# Patient Record
Sex: MALE | Race: OTHER | Employment: FULL TIME | URBAN - METROPOLITAN AREA
[De-identification: names, ages, dates, MRNs, and addresses within clinical notes are randomized per-mention and may not be internally consistent; named-entity substitution may affect disease eponyms.]

---

## 2017-04-19 ENCOUNTER — OFFICE VISIT (OUTPATIENT)
Dept: NEUROLOGY | Age: 37
End: 2017-04-19

## 2017-04-19 VITALS
HEIGHT: 72 IN | WEIGHT: 200.2 LBS | SYSTOLIC BLOOD PRESSURE: 118 MMHG | BODY MASS INDEX: 27.12 KG/M2 | DIASTOLIC BLOOD PRESSURE: 80 MMHG | OXYGEN SATURATION: 98 % | HEART RATE: 86 BPM

## 2017-04-19 DIAGNOSIS — E55.9 VITAMIN D DEFICIENCY: ICD-10-CM

## 2017-04-19 DIAGNOSIS — R20.2 PARESTHESIA: Primary | ICD-10-CM

## 2017-04-19 RX ORDER — ERGOCALCIFEROL 1.25 MG/1
50000 CAPSULE ORAL
Qty: 12 CAP | Refills: 0 | Status: SHIPPED | OUTPATIENT
Start: 2017-04-19 | End: 2017-07-06

## 2017-04-19 NOTE — PROGRESS NOTES
NEUROLOGY NEW PATIENT CONSULATION  REFERRED BY:  No primary care provider on file. 04/19/17    Chief Complaint   Patient presents with    New Patient     Muscle weakness in legs/arms/burning all over       [address] [facility]  Tian Dudley is a 42 y.o. male who presented to the neurology office for management of burning sensation. The patient states that the symptoms started around a month ago and it started off with a burning sensation of the face. That resolved and then the patient had burning and tingling sensation of his fingers and hands and later on he developed it in his feet bilaterally. His symptoms lasts for a few seconds to a few minutes. .  There are no aggravating or relieving factors and he does not complain of any motor symptoms. He did go to the [facility] and got blood work and that was normal.    Current Outpatient Prescriptions   Medication Sig    ergocalciferol (ERGOCALCIFEROL) 50,000 unit capsule Take 1 Cap by mouth every seven (7) days for 12 doses. No current facility-administered medications for this visit. Past medical history  No hypertension or diabetes    Family history  No history of migraines or neuromuscular problems    Social History   Substance Use Topics    Smoking status: Never Smoker    Smokeless tobacco: None    Alcohol use No       REVIEW OF SYSTEMS:   A ten system review of constitutional, cardiovascular, respiratory, musculoskeletal, endocrine, skin, SHEENT, genitourinary, psychiatric and neurologic systems was obtained and is unremarkable with the exception of numbness and tingling     EXAMINATION:   Visit Vitals    /80    Pulse 86    Ht 6' (1.829 m)    Wt 200 lb 3.2 oz (90.8 kg)    SpO2 98%    BMI 27.15 kg/m2        General:   General appearance: Pt is in no acute distress   Distal pulses are preserved  Fundoscopic Exam: Attempted    Neurological Examination:   Mental Status: AAO x3. Speech is fluent.  Follows commands, has normal fund of knowledge, attention, short term recall, comprehension and insight. Cranial Nerves: Visual fields are full. PERRL, Extraocular movements are full. Facial sensation intact V1- V3. Facial movement intact, symmetric. Hearing intact to conversation. Palate elevates symmetrically. Shoulder shrug symmetric. Tongue midline. Motor: Strength is 5/5 in all 4 ext. No atrophy. Tone: Normal    Sensation: Normal to light touch    Reflexes: DTRs 2+ throughout. Plantar responses downgoing. Coordination/Cerebellar: Intact to finger-nose-finger     Gait: Romberg is negative and casual gait is normal.     Skin: No significant bruising or lacerations. Laboratory review:   No results found for this or any previous visit. 4/13/2017  Vitamin D 18.6, CBC normal, thyroid function test normal, vitamin B-12 1205, CMP normal    Imaging review:  None    Documentation review:  I did review the primary care physician's note from April 13, 2017. The patient presented with concern of tingling of feet and toes and sometimes arm weakness. The patient is wondering if he has diabetes. The patient is also planning to see neurology. Patient started taking vitamin B12 orally. Examination is normal.  Vitamin B12, thyroid function test, vitamin D and CBC was ordered. Assessment/Plan:   Jennifer Melissa is a 42 y.o. male who presented to the neurology office for management of intermittent burning and stinging sensation sometimes in the face and sometimes in the upper or lower extremities. When it happens it is in a small area. His initial blood work has been normal.  Examination is normal as well. I plan to get MRI of the brain to look into the possibility of multiple sclerosis. Also, I will be performing an EMG/nerve conduction study to look for any nerve damage. He does have vitamin D deficiency and I am going to prescribe vitamin D 50,000 units to be taken every week for the next 12 weeks.     Follow-up in 3 months      ICD-10-CM ICD-9-CM    1. Paresthesia R20.2 782.0 MRI BRAIN WO CONT      EMG LIMITED   2. Vitamin D deficiency E55.9 268.9 ergocalciferol (ERGOCALCIFEROL) 50,000 unit capsule      Thank you for allowing me to participate in the care of Mr. Paul Key. Please feel free to contact me if you have any questions. Estpehania Walker MD  Diplomate, American Board of Psychiatry & Neurology (Neurology)  [place] Board of Psychiatry & Neurology (Clinical Neurophysiology)    CC: No primary care provider on file. Fax: None    This note was created using voice recognition software. Despite editing, there may be syntax errors. This note will not be viewable in [address]

## 2017-04-19 NOTE — LETTER
4/19/2017 4:05 PM 
 
Patient:    Sergio Alejandra YOB: 1980 Date of Visit:    4/19/2017 Dear No primary care provider on file. Thank you for referring Mr. Sergio Alejandra to me for evaluation/treatment. Below are the relevant portions of my assessment and plan of care. NEUROLOGY NEW PATIENT CONSULATION 
REFERRED BY: 
No primary care provider on file. 04/19/17 Chief Complaint Patient presents with  New Patient Muscle weakness in legs/arms/burning all over HISTORY OF PRESENT ILLNESS Sergio Alejandra is a 42 y.o. male who presented to the neurology office for management of burning sensation. The patient states that the symptoms started around a month ago and it started off with a burning sensation of the face. That resolved and then the patient had burning and tingling sensation of his fingers and hands and later on he developed it in his feet bilaterally. His symptoms lasts for a few seconds to a few minutes. .  There are no aggravating or relieving factors and he does not complain of any motor symptoms. He did go to the [facility] and got blood work and that was normal. 
 
Current Outpatient Prescriptions Medication Sig  ergocalciferol (ERGOCALCIFEROL) 50,000 unit capsule Take 1 Cap by mouth every seven (7) days for 12 doses. No current facility-administered medications for this visit. Past medical history No hypertension or diabetes Family history No history of migraines or neuromuscular problems Social History Substance Use Topics  Smoking status: Never Smoker  Smokeless tobacco: None  Alcohol use No  
 
 
REVIEW OF SYSTEMS:  
A ten system review of constitutional, cardiovascular, respiratory, musculoskeletal, endocrine, skin, SHEENT, genitourinary, psychiatric and neurologic systems was obtained and is unremarkable with the exception of numbness and tingling EXAMINATION:  
Visit Vitals  /80  Pulse 86  
  Ht 6' (1.829 m)  Wt 200 lb 3.2 oz (90.8 kg)  SpO2 98%  BMI 27.15 kg/m2 General:  
General appearance: Pt is in no acute distress Distal pulses are preserved Fundoscopic Exam: Attempted Neurological Examination:  
Mental Status: AAO x3. Speech is fluent. Follows commands, has normal fund of knowledge, attention, short term recall, comprehension and insight. Cranial Nerves: Visual fields are full. PERRL, Extraocular movements are full. Facial sensation intact V1- V3. Facial movement intact, symmetric. Hearing intact to conversation. Palate elevates symmetrically. Shoulder shrug symmetric. Tongue midline. Motor: Strength is 5/5 in all 4 ext. No atrophy. Tone: Normal 
 
Sensation: Normal to light touch Reflexes: DTRs 2+ throughout. Plantar responses downgoing. Coordination/Cerebellar: Intact to finger-nose-finger Gait: Romberg is negative and casual gait is normal.  
 
Skin: No significant bruising or lacerations. Laboratory review: No results found for this or any previous visit. 4/13/2017 Vitamin D 18.6, CBC normal, thyroid function test normal, vitamin B-12 1205, CMP normal 
 
Imaging review: 
None Documentation review: I did review the primary care physician's note from April 13, 2017. The patient presented with concern of tingling of feet and toes and sometimes arm weakness. The patient is wondering if he has diabetes. The patient is also planning to see neurology. Patient started taking vitamin B12 orally. Examination is normal.  Vitamin B12, thyroid function test, vitamin D and CBC was ordered. Assessment/Plan:  
Radha Devries is a 42 y.o. male who presented to the neurology office for management of intermittent burning and stinging sensation sometimes in the face and sometimes in the upper or lower extremities. When it happens it is in a small area.   His initial blood work has been normal.  Examination is normal as well. I plan to get MRI of the brain to look into the possibility of multiple sclerosis. Also, I will be performing an EMG/nerve conduction study to look for any nerve damage. He does have vitamin D deficiency and I am going to prescribe vitamin D 50,000 units to be taken every week for the next 12 weeks. Follow-up in 3 months ICD-10-CM ICD-9-CM 1. Paresthesia R20.2 782.0 MRI BRAIN WO CONT  
   EMG LIMITED 2. Vitamin D deficiency E55.9 268.9 ergocalciferol (ERGOCALCIFEROL) 50,000 unit capsule Thank you for allowing me to participate in the care of Mr. Tosha Neff. Please feel free to contact me if you have any questions. Ranjana Rea MD 
Diplomate, American Board of Psychiatry & Neurology (Neurology) [facility] Board of Psychiatry & Neurology (Clinical Neurophysiology) CC: No primary care provider on file. Fax: None This note was created using voice recognition software. Despite editing, there may be syntax errors. This note will not be viewable in [address] If you have questions, please do not hesitate to call me. I look forward to following Mr. Mcdonald along with you. Sincerely, Ranjana Rea MD

## 2017-04-19 NOTE — PATIENT INSTRUCTIONS
1.  EMG/nerve conduction study  2. MRI of the brain without contrast  3. Vitamin D 50,000 units every week for 12 weeks  4. Follow-up in 3 months      A Healthy Lifestyle: Care Instructions  Your Care Instructions  A healthy lifestyle can help you feel good, stay at a healthy weight, and have plenty of energy for both work and play. A healthy lifestyle is something you can share with your whole family. A healthy lifestyle also can lower your risk for serious health problems, such as high blood pressure, heart disease, and diabetes. You can follow a few steps listed below to improve your health and the health of your family. Follow-up care is a key part of your treatment and safety. Be sure to make and go to all appointments, and call your doctor if you are having problems. Its also a good idea to know your test results and keep a list of the medicines you take. How can you care for yourself at home? · Do not eat too much sugar, fat, or fast foods. You can still have dessert and treats now and then. The goal is moderation. · Start small to improve your eating habits. Pay attention to portion sizes, drink less juice and soda pop, and eat more fruits and vegetables. ¨ Eat a healthy amount of food. A 3-ounce serving of meat, for example, is about the size of a deck of cards. Fill the rest of your plate with vegetables and whole grains. ¨ Limit the amount of soda and sports drinks you have every day. Drink more water when you are thirsty. ¨ Eat at least 5 servings of fruits and vegetables every day. It may seem like a lot, but it is not hard to reach this goal. A serving or helping is 1 piece of fruit, 1 cup of vegetables, or 2 cups of leafy, raw vegetables. Have an apple or some carrot sticks as an afternoon snack instead of a candy bar. Try to have fruits and/or vegetables at every meal.  · Make exercise part of your daily routine.  You may want to start with simple activities, such as walking, bicycling, or slow swimming. Try to be active 30 to 60 minutes every day. You do not need to do all 30 to 60 minutes all at once. For example, you can exercise 3 times a day for 10 or 20 minutes. Moderate exercise is safe for most people, but it is always a good idea to talk to your doctor before starting an exercise program.  · Keep moving. [place] the lawn, work in the garden, or [place]. Take the stairs instead of the elevator at work. · If you smoke, quit. People who smoke have an increased risk for heart attack, stroke, cancer, and other lung illnesses. Quitting is hard, but there are ways to boost your chance of quitting tobacco for good. ¨ Use nicotine gum, patches, or lozenges. ¨ Ask your doctor about stop-smoking programs and medicines. ¨ Keep trying. In addition to reducing your risk of diseases in the future, you will notice some benefits soon after you stop using tobacco. If you have shortness of breath or asthma symptoms, they will likely get better within a few weeks after you quit. · Limit how much alcohol you drink. Moderate amounts of alcohol (up to 2 drinks a day for men, 1 drink a day for women) are okay. But drinking too much can lead to liver problems, high blood pressure, and other health problems. Family health  If you have a family, there are many things you can do together to improve your health. · Eat meals together as a family as often as possible. · Eat healthy foods. This includes fruits, vegetables, lean meats and dairy, and whole grains. · Include your family in your fitness plan. Most people think of activities such as jogging or tennis as the way to fitness, but there are many ways you and your family can be more active. Anything that makes you breathe hard and gets your heart pumping is exercise. Here are some tips:  ¨ Walk to do errands or to take your child to school or the bus. ¨ Go for a family bike ride after dinner instead of watching TV. Where can you learn more?   Go to http://jayda-channing.info/. Enter O808 in the search box to learn more about \"A Healthy Lifestyle: Care Instructions. \"  Current as of: July 26, 2016  Content Version: 11.2  © 2713-5556 [employer]. Care instructions adapted under license by [employer] (which disclaims liability or warranty for this information). If you have questions about a medical condition or this instruction, always ask your healthcare professional. [address] any warranty or liability for your use of this information.

## 2017-04-19 NOTE — MR AVS SNAPSHOT
Visit Information Date & Time Provider Department Dept. Phone Encounter #  
 4/19/2017  3:00 PM Rahat Pereira MD [facility]rology [facility] at [facility] [phone] [id number] Upcoming Health Maintenance Date Due DTaP/Tdap/Td series (1 - Tdap) 3/16/1999 INFLUENZA AGE 9 TO ADULT 8/1/2016 Allergies as of 4/19/2017  Review Complete On: 4/19/2017 By: Rahat Pereira MD  
 Not on File Current Immunizations  Never Reviewed No immunizations on file. Not reviewed this visit You Were Diagnosed With   
  
 Codes Comments Paresthesia    -  Primary ICD-10-CM: R20.2 ICD-9-CM: 968. 0 Vitamin D deficiency     ICD-10-CM: E55.9 ICD-9-CM: 268.9 Vitals BP Pulse Height(growth percentile) Weight(growth percentile) SpO2 BMI  
 118/80 86 6' (1.829 m) 200 lb 3.2 oz (90.8 kg) 98% 27.15 kg/m2 Smoking Status Never Smoker Vitals History BMI and BSA Data Body Mass Index Body Surface Area  
 27.15 kg/m 2 2.15 m 2 Preferred Pharmacy Pharmacy Name Phone [id number], [address] [phone] Your Updated Medication List  
  
   
This list is accurate as of: 4/19/17  3:55 PM.  Always use your most recent med list.  
  
  
  
  
 ergocalciferol 50,000 unit capsule Commonly known as:  ERGOCALCIFEROL Take 1 Cap by mouth every seven (7) days for 12 doses. Prescriptions Sent to Pharmacy Refills  
 ergocalciferol (ERGOCALCIFEROL) 50,000 unit capsule 0 Sig: Take 1 Cap by mouth every seven (7) days for 12 doses. Class: Normal  
 Pharmacy: [id number]- [address] Ph #: [phone] Route: Oral  
  
To-Do List   
 04/19/2017 Neurology:  EMG LIMITED   
  
 04/19/2017 Imaging:  MRI BRAIN WO CONT Patient Instructions 1.  EMG/nerve conduction study 2. MRI of the brain without contrast 
3. Vitamin D 50,000 units every week for 12 weeks 4. Follow-up in 3 months A Healthy Lifestyle: Care Instructions Your Care Instructions A healthy lifestyle can help you feel good, stay at a healthy weight, and have plenty of energy for both work and play. A healthy lifestyle is something you can share with your whole family. A healthy lifestyle also can lower your risk for serious health problems, such as high blood pressure, heart disease, and diabetes. You can follow a few steps listed below to improve your health and the health of your family. Follow-up care is a key part of your treatment and safety. Be sure to make and go to all appointments, and call your doctor if you are having problems. Its also a good idea to know your test results and keep a list of the medicines you take. How can you care for yourself at home? · Do not eat too much sugar, fat, or fast foods. You can still have dessert and treats now and then. The goal is moderation. · Start small to improve your eating habits. Pay attention to portion sizes, drink less juice and soda pop, and eat more fruits and vegetables. ¨ Eat a healthy amount of food. A 3-ounce serving of meat, for example, is about the size of a deck of cards. Fill the rest of your plate with vegetables and whole grains. ¨ Limit the amount of soda and sports drinks you have every day. Drink more water when you are thirsty. ¨ Eat at least 5 servings of fruits and vegetables every day. It may seem like a lot, but it is not hard to reach this goal. A serving or helping is 1 piece of fruit, 1 cup of vegetables, or 2 cups of leafy, raw vegetables. Have an apple or some carrot sticks as an afternoon snack instead of a candy bar. Try to have fruits and/or vegetables at every meal. 
· Make exercise part of your daily routine.  You may want to start with simple activities, such as walking, bicycling, or slow swimming. Try to be active 30 to 60 minutes every day. You do not need to do all 30 to 60 minutes all at once. For example, you can exercise 3 times a day for 10 or 20 minutes. Moderate exercise is safe for most people, but it is always a good idea to talk to your doctor before starting an exercise program. 
· Keep moving. Megha [place] the lawn, work in the garden, or [place]. Take the stairs instead of the elevator at work. · If you smoke, quit. People who smoke have an increased risk for heart attack, stroke, cancer, and other lung illnesses. Quitting is hard, but there are ways to boost your chance of quitting tobacco for good. ¨ Use nicotine gum, patches, or lozenges. ¨ Ask your doctor about stop-smoking programs and medicines. ¨ Keep trying. In addition to reducing your risk of diseases in the future, you will notice some benefits soon after you stop using tobacco. If you have shortness of breath or asthma symptoms, they will likely get better within a few weeks after you quit. · Limit how much alcohol you drink. Moderate amounts of alcohol (up to 2 drinks a day for men, 1 drink a day for women) are okay. But drinking too much can lead to liver problems, high blood pressure, and other health problems. Family health If you have a family, there are many things you can do together to improve your health. · Eat meals together as a family as often as possible. · Eat healthy foods. This includes fruits, vegetables, lean meats and dairy, and whole grains. · Include your family in your fitness plan. Most people think of activities such as jogging or tennis as the way to fitness, but there are many ways you and your family can be more active. Anything that makes you breathe hard and gets your heart pumping is exercise. Here are some tips: 
¨ Walk to do errands or to take your child to school or the bus. ¨ Go for a family bike ride after dinner instead of watching TV. Where can you learn more? Go to http://jayda-channing.info/. Enter S373 in the search box to learn more about \"A Healthy Lifestyle: Care Instructions. \" Current as of: July 26, 2016 Content Version: 11.2 © 5209-5539 [employer]. Care instructions adapted under license by [employer] (which disclaims liability or warranty for this information). If you have questions about a medical condition or this instruction, always ask your healthcare professional. [address] any warranty or liability for your use of this information. Introducing [facility]! Damon Jay introduces Get In patient portal. Now you can access parts of your medical record, email your doctor's office, and request medication refills online. 1. In your internet browser, go to [email or web address] 2. Click on the First Time User? Click Here link in the Sign In box. You will see the New Member Sign Up page. 3. Enter your Get In Access Code exactly as it appears below. You will not need to use this code after youve completed the sign-up process. If you do not sign up before the expiration date, you must request a new code. · Get In Access Code: [email or web address] Expires: 7/18/2017  3:01 PM 
 
4. Enter the last four digits of your Social Security Number (xxxx) and Date of Birth (mm/dd/yyyy) as indicated and click Submit. You will be taken to the next sign-up page. 5. Create a Clear Metalst ID. This will be your Get In login ID and cannot be changed, so think of one that is secure and easy to remember. 6. Create a [employer] password. You can change your password at any time. 7. Enter your Password Reset Question and Answer. This can be used at a later time if you forget your password. 8. Enter your e-mail address.  You will receive e-mail notification when new information is available in [email or web address]. 9. Click Sign Up. You can now view and download portions of your medical record. 10. Click the Download Summary menu link to download a portable copy of your medical information. If you have questions, please visit the Frequently Asked Questions section of the [email or web address] website. Remember, [email or web address] is NOT to be used for urgent needs. For medical emergencies, dial 911. Now available from your iPhone and Android! Please provide this summary of care documentation to your next provider. If you have any questions after today's visit, please call [phone].

## 2017-05-25 ENCOUNTER — OFFICE VISIT (OUTPATIENT)
Dept: NEUROLOGY | Age: 37
End: 2017-05-25

## 2017-05-25 VITALS — SYSTOLIC BLOOD PRESSURE: 131 MMHG | DIASTOLIC BLOOD PRESSURE: 91 MMHG | HEART RATE: 97 BPM | OXYGEN SATURATION: 98 %

## 2017-05-25 DIAGNOSIS — R20.2 PARESTHESIA: Primary | ICD-10-CM

## 2017-05-25 DIAGNOSIS — E55.9 VITAMIN D DEFICIENCY: ICD-10-CM

## 2017-05-25 NOTE — PROGRESS NOTES
Neurology follow-up note    REFERRED BY:  No primary care provider on file. 05/25/17    Chief complaint: Burning sensation    Subjective  Benjamin Curiel is a 42 y.o. male who presented to the neurology office for management of burning sensation. The patient states that the symptoms started around a month ago and it started off with a burning sensation of the face. That resolved and then the patient had burning and tingling sensation of his fingers and hands and later on he developed it in his feet bilaterally. His symptoms lasts for a few seconds to a few minutes. .  There are no aggravating or relieving factors and he does not complain of any motor symptoms. He did go to the [facility] and got blood work and that was normal.    Since the last visit, I did perform an EMG/nerve conduction study which was normal and did not show any length dependent neuropathy. Current Outpatient Prescriptions   Medication Sig    ergocalciferol (ERGOCALCIFEROL) 50,000 unit capsule Take 1 Cap by mouth every seven (7) days for 12 doses. No current facility-administered medications for this visit. REVIEW OF SYSTEMS:   A ten system review of constitutional, cardiovascular, respiratory, musculoskeletal, endocrine, skin, SHEENT, genitourinary, psychiatric and neurologic systems was obtained and is unremarkable with the exception of numbness and tingling     EXAMINATION:   Visit Vitals    BP (!) 131/91 (BP 1 Location: Right arm, BP Patient Position: Sitting)    Pulse 97    SpO2 98%        General:   General appearance: Pt is in no acute distress   Distal pulses are preserved    Neurological Examination:   Mental Status: AAO x3. Speech is fluent. Follows commands, has normal fund of knowledge, attention, short term recall, comprehension and insight. Cranial Nerves: Visual fields are full. PERRL, Extraocular movements are full. Facial sensation intact V1- V3. Facial movement intact, symmetric.  Hearing intact to conversation. Palate elevates symmetrically. Shoulder shrug symmetric. Tongue midline. Motor: Strength is 5/5 in all 4 ext. No atrophy. Tone: Normal    Sensation: Normal to light touch    Coordination/Cerebellar: Intact to finger-nose-finger     Gait: Romberg is negative and casual gait is normal.     Skin: No significant bruising or lacerations. Laboratory review:   No results found for this or any previous visit. 4/13/2017  Vitamin D 18.6, CBC normal, thyroid function test normal, vitamin B-12 1205, CMP normal    Imaging review:  5/25/2017  EMG/nerve conduction study  There is no electrophysiological evidence of a length dependent peripheral neuropathy. Documentation review:  I did review the primary care physician's note from April 13, 2017. The patient presented with concern of tingling of feet and toes and sometimes arm weakness. The patient is wondering if he has diabetes. The patient is also planning to see neurology. Patient started taking vitamin B12 orally. Examination is normal.  Vitamin B12, thyroid function test, vitamin D and CBC was ordered. Assessment/Plan:   Magalis Modi is a 42 y.o. male who presented to the neurology office for management of intermittent burning and stinging sensation sometimes in the face and sometimes in the upper or lower extremities. When it happens it is in a small area. His initial blood work has been normal.  Examination is normal as well. EMG/nerve conduction study was performed today and it is normal.  MRI of the brain is pending. He does have vitamin D deficiency and I am going to prescribe vitamin D 50,000 units to be taken every week for the next 12 weeks. Follow-up in 3 months      ICD-10-CM ICD-9-CM    1. Paresthesia R20.2 782.0    2. Vitamin D deficiency E55.9 268.9       Thank you for allowing me to participate in the care of Mr. Carolyn Fish. Please feel free to contact me if you have any questions.     Maddi Nuñez MD  Diplomate, American Board of Psychiatry & Neurology (Neurology)  Lilliam Alvarez Board of Psychiatry & Neurology (Clinical Neurophysiology)    CC: No primary care provider on file. Fax: None    This note was created using voice recognition software. Despite editing, there may be syntax errors. This note will not be viewable in [address]

## 2017-05-25 NOTE — PROCEDURES
[facility] Neurology Clinic at [address] [facility] [address] [facility], [address]  Tel [phone]     Fax [phone]  Electrodiagnostic Study Report    Patient: Maurice Haro  Age: 42   [facility]#: [id number]  Ref Phys: Brian Mejia   Sex: Male  Physician: Jose Alfredo Jenkins MD   Height: ' \"      : 1980  DOS: 2017       Reason for study:  Maurice Haro 42 y.o. male Presents with burning and tingling.   Query r/o neuropathy    Anti - Sensory Nerve Conduction Studies     Stim Site NR Peak (ms) P-T Amp (µV) Site1 Site2 Delta-P (ms) Dist (cm) Lance (m/s)   Right Median Anti Sensory (2nd Digit)  33.4°C   Wrist    2.9 39.6 Wrist 2nd Digit 2.9 14.0     Right Radial Anti Sensory (Base 1st Digit)  32.9°C   Wrist    2.1 24.9 Wrist Base 1st Digit 2.1 0.0    Right Sup Fibular Anti Sensory (Ant Lat Mall)  31.7°C   14 cm    2.7 11.4 14 cm Ant Lat Mall 2.7 14.0     Right Sural Anti Sensory (Lat Mall)  31.6°C   Calf    3.5 13.5 Calf Lat Mall 3.5 14.0     Right Ulnar Anti Sensory (5th Digit)  33.3°C   Wrist    2.6 37.8 Wrist 5th Digit 2.6 14.0                    Motor Nerve Conduction Studies     Stim Site NR Onset (ms) O-P Amp (mV) Site1 Site2 Delta-0 (ms) Dist (cm) Lance (m/s)   Right Fibular Motor (Ext Dig Brev)  30.9°C   Ankle    4.5 5.6 B Fib Ankle 7.8 36.5 47   B Fib    12.3 4.8 Poplt B Fib 1.9 10.0 53   Poplt    14.2 4.5        Right Median Motor (Abd Poll Brev)  33.5°C   Wrist    2.8 9.7 Elbow Wrist 4.9 29.5 60T   Elbow    7.7 9.4        Right Tibial Motor (Abd Can Brev)  31.4°C   Ankle    3.3 7.8 Knee Ankle 9.9 42.0 42   Knee    13.2 6.9        Right Ulnar Motor (Abd Dig Minimi)  33.1°C   Wrist    2.4 8.0 B Elbow Wrist 4.4 26.0 59   B Elbow    6.8 7.3 A Elbow B Elbow 1.5 10.0 67   A Elbow    8.3 6.9                      Electromyography     Side Muscle Nerve Root Ins Act Fibs Psw Amp Dur Poly Recrt Comment   Right AntTibialis Dp Br Fibular L4-5 Nml Nml Nml Nml Nml 0 Nml    Right Gastroc Tibial S1-2 Nml Nml Nml Nml Nml 0 Nml    Right VastusLat Femoral L2-4 Nml Nml Nml Nml Nml 0 Nml    Right BicepsFemS Sciatic L5-S1 Nml Nml Nml Nml Nml 0 Nml    Right TensorFascLat SupGluteal L4-5, S1 Nml Nml Nml Nml Nml 0 Nml    Right 1stDorInt Ulnar C8-T1 Nml Nml Nml Nml Nml 0 Nml    Right PronatorTeres Median C6-7 Nml Nml Nml Nml Nml 0 Nml    Right Biceps Musculocut C5-6 Nml Nml Nml Nml Nml 0 Nml    Right Triceps Radial C6-7-8 Nml Nml Nml Nml Nml 0 Nml    Right Deltoid Axillary C5-6 Nml Nml Nml Nml Nml 0 Nml        Summary:  Nerve conduction studies of the right upper and lower extremities were unremarkable. Needle electrode examination of the right upper and lower extremities was unremarkable. Impression: This is a normal study. There is no electrophysiological evidence of a length dependent peripheral neuropathy or a right cervical or lumbar radiculopathy.         __________________  Ranjana Rea M.D.

## 2017-07-10 ENCOUNTER — HOSPITAL ENCOUNTER (OUTPATIENT)
Dept: MRI IMAGING | Age: 37
Discharge: HOME | End: 2017-07-10
Attending: PSYCHIATRY & NEUROLOGY
Payer: COMMERCIAL

## 2017-07-10 DIAGNOSIS — R20.2 PARESTHESIA: ICD-10-CM

## 2017-07-10 PROCEDURE — 70551 MRI BRAIN STEM W/O DYE: CPT

## 2017-07-13 ENCOUNTER — OFFICE VISIT (OUTPATIENT)
Dept: NEUROLOGY | Age: 37
End: 2017-07-13

## 2017-07-13 ENCOUNTER — TELEPHONE (OUTPATIENT)
Dept: NEUROLOGY | Age: 37
End: 2017-07-13

## 2017-07-13 VITALS
DIASTOLIC BLOOD PRESSURE: 74 MMHG | OXYGEN SATURATION: 97 % | BODY MASS INDEX: 26.98 KG/M2 | SYSTOLIC BLOOD PRESSURE: 114 MMHG | HEART RATE: 79 BPM | HEIGHT: 72 IN | WEIGHT: 199.2 LBS

## 2017-07-13 DIAGNOSIS — E55.9 VITAMIN D DEFICIENCY: ICD-10-CM

## 2017-07-13 DIAGNOSIS — G60.3 IDIOPATHIC PROGRESSIVE NEUROPATHY: ICD-10-CM

## 2017-07-13 DIAGNOSIS — R20.2 PARESTHESIA: Primary | ICD-10-CM

## 2017-07-13 NOTE — PROCEDURES
NEUROLOGY CLINIC AT [facility]  OFFICE PROCEDURE PROGRESS NOTE        PERFORMING PHYSICIAN: Jeremias Laughlin MD   PROCEDURE:  Skin Punch Biopsy    CPT Code: 37924, 11101 X 2    ICD-10 Code: Polyneuropathy, unspecified  G62.9     Medications/ Supplies:   Skin-punch biopsy kit from [address] [place] [address] including: skin punch biopsy tool, povidone/ iodine prep pad, alcohol prep pad   1% Lidocaine with epinephrine  3 mL syringe, 30G 1/2\" needle    The procedure and potential complications were explained to the patient, and verbal consent was obtained. The skin was cleansed with the betadine swabs over the:    - Right EDB (foot)  - Right Calf (10 centimeters proximal to the lat malleolus)  - Right wrist - 6 cm proximal to the wrist    The perimeter of the cleansed areas was infiltrated with 1% lidocaine with epinephrine. A skin punch biopsy was performed at the distal site with the provided biopsy tool, and the biopsy was placed into container labeled distal sample. The process was repeated at the proximal site and the biopsy was placed into the container labeled proximal sample. The containers were appropriately labeled with the patient names, lids tightened and placed on ice in the supplied styrofoam box and sent by [employer] to the lab. The biopsy sites were cleaned with alcohol swabs x 2 then bandages applied. There were no immediate or obvious complications and the patient did not complain of pain afterwards.          ___________________  Jeremias Laughlin MD

## 2017-07-13 NOTE — TELEPHONE ENCOUNTER
For skin biopsies [id number] and [id number] rick perez'charbel per \"Julio INGRAM\" at 1:57 today [facility].

## 2017-07-13 NOTE — TELEPHONE ENCOUNTER
----- Message from Sarah Monzon sent at 7/13/2017  9:39 AM EDT -----  Regarding: Dr. Fuad Polanco wife Gladys Mcduffie is requesting an appointment for either today or tomorrow for the pt. He will not be able to attend his upcoming appointment due to having to go out of town. Pt best contact number is [phone].

## 2017-07-13 NOTE — PATIENT INSTRUCTIONS
Please call in 2 weeks for the result of the skin biopsy. Call with questions     A Healthy Lifestyle: Care Instructions  Your Care Instructions  A healthy lifestyle can help you feel good, stay at a healthy weight, and have plenty of energy for both work and play. A healthy lifestyle is something you can share with your whole family. A healthy lifestyle also can lower your risk for serious health problems, such as high blood pressure, heart disease, and diabetes. You can follow a few steps listed below to improve your health and the health of your family. Follow-up care is a key part of your treatment and safety. Be sure to make and go to all appointments, and call your doctor if you are having problems. Its also a good idea to know your test results and keep a list of the medicines you take. How can you care for yourself at home? · Do not eat too much sugar, fat, or fast foods. You can still have dessert and treats now and then. The goal is moderation. · Start small to improve your eating habits. Pay attention to portion sizes, drink less juice and soda pop, and eat more fruits and vegetables. ¨ Eat a healthy amount of food. A 3-ounce serving of meat, for example, is about the size of a deck of cards. Fill the rest of your plate with vegetables and whole grains. ¨ Limit the amount of soda and sports drinks you have every day. Drink more water when you are thirsty. ¨ Eat at least 5 servings of fruits and vegetables every day. It may seem like a lot, but it is not hard to reach this goal. A serving or helping is 1 piece of fruit, 1 cup of vegetables, or 2 cups of leafy, raw vegetables. Have an apple or some carrot sticks as an afternoon snack instead of a candy bar. Try to have fruits and/or vegetables at every meal.  · Make exercise part of your daily routine. You may want to start with simple activities, such as walking, bicycling, or slow swimming. Try to be active 30 to 60 minutes every day.  You do not need to do all 30 to 60 minutes all at once. For example, you can exercise 3 times a day for 10 or 20 minutes. Moderate exercise is safe for most people, but it is always a good idea to talk to your doctor before starting an exercise program.  · Keep moving. Dominick Boron the lawn, work in the garden, or [employer]. Take the stairs instead of the elevator at work. · If you smoke, quit. People who smoke have an increased risk for heart attack, stroke, cancer, and other lung illnesses. Quitting is hard, but there are ways to boost your chance of quitting tobacco for good. ¨ Use nicotine gum, patches, or lozenges. ¨ Ask your doctor about stop-smoking programs and medicines. ¨ Keep trying. In addition to reducing your risk of diseases in the future, you will notice some benefits soon after you stop using tobacco. If you have shortness of breath or asthma symptoms, they will likely get better within a few weeks after you quit. · Limit how much alcohol you drink. Moderate amounts of alcohol (up to 2 drinks a day for men, 1 drink a day for women) are okay. But drinking too much can lead to liver problems, high blood pressure, and other health problems. Family health  If you have a family, there are many things you can do together to improve your health. · Eat meals together as a family as often as possible. · Eat healthy foods. This includes fruits, vegetables, lean meats and dairy, and whole grains. · Include your family in your fitness plan. Most people think of activities such as jogging or tennis as the way to fitness, but there are many ways you and your family can be more active. Anything that makes you breathe hard and gets your heart pumping is exercise. Here are some tips:  ¨ Walk to do errands or to take your child to school or the bus. ¨ Go for a family bike ride after dinner instead of watching TV. Where can you learn more? Go to http://jayda-channing.info/.   Enter C908 in the search box to learn more about \"A Healthy Lifestyle: Care Instructions. \"  Current as of: July 26, 2016  Content Version: 11.3  © 7711-3441 [employer]. Care instructions adapted under license by [employer] (which disclaims liability or warranty for this information). If you have questions about a medical condition or this instruction, always ask your healthcare professional. [address] any warranty or liability for your use of this information.

## 2017-07-13 NOTE — MR AVS SNAPSHOT
Visit Information Date & Time Provider Department Dept. Phone Encounter #  
 7/13/2017  1:00 PM Araceli Ba MD Neurology Clinic at [facility] [phone] [id number] Upcoming Health Maintenance Date Due DTaP/Tdap/Td series (1 - Tdap) 3/16/1999 INFLUENZA AGE 9 TO ADULT 8/1/2017 Allergies as of 7/13/2017  Review Complete On: 7/13/2017 By: Araceli Ba MD  
 Not on File Current Immunizations  Never Reviewed No immunizations on file. Not reviewed this visit You Were Diagnosed With   
  
 Codes Comments Paresthesia    -  Primary ICD-10-CM: R20.2 ICD-9-CM: 809. 0 Vitamin D deficiency     ICD-10-CM: E55.9 ICD-9-CM: 268.9 Idiopathic progressive neuropathy     ICD-10-CM: G60.3 ICD-9-CM: 523. 4 Vitals BP Pulse Height(growth percentile) Weight(growth percentile) SpO2 BMI  
 114/74 79 6' (1.829 m) 199 lb 3.2 oz (90.4 kg) 97% 27.02 kg/m2 Smoking Status Never Smoker BMI and BSA Data Body Mass Index Body Surface Area  
 27.02 kg/m 2 2.14 m 2 Preferred Pharmacy Pharmacy Name Phone [id number] [place], [address] [phone] Your Updated Medication List  
  
Notice  As of 7/13/2017  2:40 PM  
 You have not been prescribed any medications. Patient Instructions Please call in 2 weeks for the result of the skin biopsy. Call with questions A Healthy Lifestyle: Care Instructions Your Care Instructions A healthy lifestyle can help you feel good, stay at a healthy weight, and have plenty of energy for both work and play. A healthy lifestyle is something you can share with your whole family. A healthy lifestyle also can lower your risk for serious health problems, such as high blood pressure, heart disease, and diabetes.  
You can follow a few steps listed below to improve your health and the health of your family. Follow-up care is a key part of your treatment and safety. Be sure to make and go to all appointments, and call your doctor if you are having problems. Its also a good idea to know your test results and keep a list of the medicines you take. How can you care for yourself at home? · Do not eat too much sugar, fat, or fast foods. You can still have dessert and treats now and then. The goal is moderation. · Start small to improve your eating habits. Pay attention to portion sizes, drink less juice and soda pop, and eat more fruits and vegetables. ¨ Eat a healthy amount of food. A 3-ounce serving of meat, for example, is about the size of a deck of cards. Fill the rest of your plate with vegetables and whole grains. ¨ Limit the amount of soda and sports drinks you have every day. Drink more water when you are thirsty. ¨ Eat at least 5 servings of fruits and vegetables every day. It may seem like a lot, but it is not hard to reach this goal. A serving or helping is 1 piece of fruit, 1 cup of vegetables, or 2 cups of leafy, raw vegetables. Have an apple or some carrot sticks as an afternoon snack instead of a candy bar. Try to have fruits and/or vegetables at every meal. 
· Make exercise part of your daily routine. You may want to start with simple activities, such as walking, bicycling, or slow swimming. Try to be active 30 to 60 minutes every day. You do not need to do all 30 to 60 minutes all at once. For example, you can exercise 3 times a day for 10 or 20 minutes. Moderate exercise is safe for most people, but it is always a good idea to talk to your doctor before starting an exercise program. 
· Keep moving. Kady Keith the lawn, work in the garden, or [address]. Take the stairs instead of the elevator at work. · If you smoke, quit. People who smoke have an increased risk for heart attack, stroke, cancer, and other lung illnesses.  Quitting is hard, but there are ways to boost your chance of quitting tobacco for good. ¨ Use nicotine gum, patches, or lozenges. ¨ Ask your doctor about stop-smoking programs and medicines. ¨ Keep trying. In addition to reducing your risk of diseases in the future, you will notice some benefits soon after you stop using tobacco. If you have shortness of breath or asthma symptoms, they will likely get better within a few weeks after you quit. · Limit how much alcohol you drink. Moderate amounts of alcohol (up to 2 drinks a day for men, 1 drink a day for women) are okay. But drinking too much can lead to liver problems, high blood pressure, and other health problems. Family health If you have a family, there are many things you can do together to improve your health. · Eat meals together as a family as often as possible. · Eat healthy foods. This includes fruits, vegetables, lean meats and dairy, and whole grains. · Include your family in your fitness plan. Most people think of activities such as jogging or tennis as the way to fitness, but there are many ways you and your family can be more active. Anything that makes you breathe hard and gets your heart pumping is exercise. Here are some tips: 
¨ Walk to do errands or to take your child to school or the bus. ¨ Go for a family bike ride after dinner instead of watching TV. Where can you learn more? Go to http://jayda-channing.info/. Enter U448 in the search box to learn more about \"A Healthy Lifestyle: Care Instructions. \" Current as of: July 26, 2016 Content Version: 11.3 © 1427-4020 [employer]. Care instructions adapted under license by [employer] (which disclaims liability or warranty for this information). If you have questions about a medical condition or this instruction, always ask your healthcare professional. [address] any warranty or liability for your use of this information. Introducing [facility]! Dear Ian Brooks: 
Thank you for requesting a tagga account. Our records indicate that you already have an active tagga account. You can access your account anytime at [email or web address] Did you know that you can access your hospital and ER discharge instructions at any time in tagga? You can also review all of your test results from your hospital stay or ER visit. Additional Information If you have questions, please visit the Frequently Asked Questions section of the tagga website at [email or web address] Remember, tagga is NOT to be used for urgent needs. For medical emergencies, dial 911. Now available from your iPhone and Android! Please provide this summary of care documentation to your next provider. Your primary care clinician is listed as NONE. If you have any questions after today's visit, please call [phone].

## 2017-07-13 NOTE — PROGRESS NOTES
Neurology follow-up note    REFERRED BY:  None    07/13/17    Chief complaint: Burning sensation    Subjective  Pablo Pedersen is a 42 y.o. male who presented to the neurology office for management of burning sensation. The patient states that the symptoms started around a month ago and it started off with a burning sensation of the face. That resolved and then the patient had burning and tingling sensation of his fingers and hands and later on he developed it in his feet bilaterally. His symptoms lasts for a few seconds to a few minutes. .  There are no aggravating or relieving factors and he does not complain of any motor symptoms. He did go to the [facility] and got blood work and that was normal.    EMG/nerve conduction study was normal and did not show any length dependent neuropathy. His MRI of the brain did not show any multiple sclerosis. He is now having small areas of burning in his back and in his feet that lasts for a few seconds. He does not want any gabapentin at this time. No current outpatient prescriptions on file. No current facility-administered medications for this visit. REVIEW OF SYSTEMS:   A ten system review of constitutional, cardiovascular, respiratory, musculoskeletal, endocrine, skin, SHEENT, genitourinary, psychiatric and neurologic systems was obtained and is unremarkable with the exception of numbness and tingling     EXAMINATION:   Visit Vitals    /74    Pulse 79    Ht 6' (1.829 m)    Wt 199 lb 3.2 oz (90.4 kg)    SpO2 97%    BMI 27.02 kg/m2        General:   General appearance: Pt is in no acute distress   Distal pulses are preserved    Neurological Examination:   Mental Status: AAO x3. Speech is fluent. Follows commands, has normal fund of knowledge, attention, short term recall, comprehension and insight. Cranial Nerves: Visual fields are full. PERRL, Extraocular movements are full. Facial sensation intact V1- V3.  Facial movement intact, symmetric. Hearing intact to conversation. Palate elevates symmetrically. Shoulder shrug symmetric. Tongue midline. Motor: Strength is 5/5 in all 4 ext. No atrophy. Tone: Normal    Sensation: Normal to light touch    Coordination/Cerebellar: Intact to finger-nose-finger     Gait: Romberg is negative and casual gait is normal.     Skin: No significant bruising or lacerations. Laboratory review:   No results found for this or any previous visit. 4/13/2017  Vitamin D 18.6, CBC normal, thyroid function test normal, vitamin B-12 1205, CMP normal    Imaging review:  7/10/2017  MRI brain without contrast  No significant abnormality  I reviewed the images    5/25/2017  EMG/nerve conduction study  There is no electrophysiological evidence of a length dependent peripheral neuropathy. Documentation review:  I did review the primary care physician's note from April 13, 2017. The patient presented with concern of tingling of feet and toes and sometimes arm weakness. The patient is wondering if he has diabetes. The patient is also planning to see neurology. Patient started taking vitamin B12 orally. Examination is normal.  Vitamin B12, thyroid function test, vitamin D and CBC was ordered. Assessment/Plan:   Susie Escobar is a 42 y.o. male who presented to the neurology office for management of intermittent burning and stinging sensation sometimes in the face and sometimes in the upper or lower extremities. When it happens it is in a small area. His initial blood work has been normal.  Examination is normal as well. EMG/nerve conduction study was performed today and it is normal.  MRI of the brain is normal.    He does have vitamin D deficiency and he has completed 12 week course of vitamin D supplementation. He states that he is having new areas of burning sensation and some of it is in his feet as well. We will proceed with skin biopsy to check for small fiber neuropathy.     The patient is relocated to [place]. Will call the patient to tell the result of the skin biopsy. Over 25 minutes was spent with the patient and family of which > 50% of the visit was spent counseling on diagnosis, management, and treatment of the diagnosis        ICD-10-CM ICD-9-CM    1. Paresthesia R20.2 782.0    2. Vitamin D deficiency E55.9 268.9       Thank you for allowing me to participate in the care of Mr. Josue Richard. Please feel free to contact me if you have any questions. Jean Carlos Dhillon MD  Diplomate, American Board of Psychiatry & Neurology (Neurology)  [employer] Board of Psychiatry & Neurology (Clinical Neurophysiology)    CC: None  Fax: None    This note was created using voice recognition software. Despite editing, there may be syntax errors. This note will not be viewable in [address]

## 2017-08-16 ENCOUNTER — TELEPHONE (OUTPATIENT)
Dept: NEUROLOGY | Age: 37
End: 2017-08-16

## 2017-08-16 NOTE — TELEPHONE ENCOUNTER
Maryuri Garcia Doctor patient sent you a e-mail regarding his skin biopsy. Patient states he walls schedule an appointment with neurologist in [place] . His question is there something that he needs to be aware of or concern?

## 2017-08-30 ENCOUNTER — TELEPHONE (OUTPATIENT)
Dept: NEUROLOGY | Age: 37
End: 2017-08-30

## 2017-08-30 NOTE — TELEPHONE ENCOUNTER
Left message on patient's voice mail that Dr. Jeremias Gamez does not have any opening and Dr. Jeremias Gamez will not be available the week of September 4th. Please call back to schedule another time.

## 2019-04-15 ENCOUNTER — OFFICE VISIT (OUTPATIENT)
Dept: NEUROLOGY | Age: 39
End: 2019-04-15

## 2019-04-15 VITALS
WEIGHT: 202 LBS | BODY MASS INDEX: 26.77 KG/M2 | OXYGEN SATURATION: 98 % | SYSTOLIC BLOOD PRESSURE: 110 MMHG | HEART RATE: 93 BPM | DIASTOLIC BLOOD PRESSURE: 75 MMHG | HEIGHT: 73 IN

## 2019-04-15 DIAGNOSIS — G60.9 IDIOPATHIC PERIPHERAL NEUROPATHY: ICD-10-CM

## 2019-04-15 DIAGNOSIS — G43.009 MIGRAINE WITHOUT AURA AND WITHOUT STATUS MIGRAINOSUS, NOT INTRACTABLE: Primary | ICD-10-CM

## 2019-04-15 RX ORDER — SUMATRIPTAN 25 MG/1
TABLET, FILM COATED ORAL
Qty: 9 TAB | Refills: 2 | Status: SHIPPED | OUTPATIENT
Start: 2019-04-15

## 2019-04-15 RX ORDER — PROPRANOLOL HYDROCHLORIDE 60 MG/1
60 CAPSULE, EXTENDED RELEASE ORAL DAILY
Qty: 30 CAP | Refills: 2 | Status: SHIPPED | OUTPATIENT
Start: 2019-04-15 | End: 2019-06-15 | Stop reason: SDUPTHER

## 2019-04-15 NOTE — PATIENT INSTRUCTIONS
1.  Imitrex 25 mg to be taken at the onset of headache. Repeat in 2 hours if headaches do persist.  Do not take more than 10 days in a month. 2.  Propranolol 60 mg daily  3. Follow-up in 6 months    Office Policies  · Phone calls/patient messages:  Please allow up to 24 hours for someone in the office to contact you about your call or message. Be mindful your provider may be out of the office or your message may require further review. We encourage you to use [employer] for your messages as this is a faster, more efficient way to communicate with our office  · Medication Refills:  Prescription medications require up to 48 business hours to process. We encourage you to use [employer] for your refills. For controlled medications: Please allow up to 72 business hours to process. Certain medications may require you to  a written prescription at our office. NO narcotic/controlled medications will be prescribed after 4pm Monday through Friday or on weekends  · Form/Paperwork Completion:  Please note there is a $25 fee for all paperwork completed by our providers. We ask that you allow 7-14 business days. Pre-payment is due prior to picking up/faxing the completed form. You may also download your forms to [employer] to have your doctor print off.

## 2019-04-15 NOTE — PROGRESS NOTES
Neurology follow-up note    Chief complaint: Burning sensation    Subjective  Lashell Henson is a 37 y.o. male who presented to the neurology office for management of burning sensation. The patient states that the symptoms started around a month ago and it started off with a burning sensation of the face. That resolved and then the patient had burning and tingling sensation of his fingers and hands and later on he developed it in his feet bilaterally. His symptoms lasts for a few seconds to a few minutes. .  There are no aggravating or relieving factors and he does not complain of any motor symptoms. He did go to the [facility] and got blood work and that was normal.    EMG/nerve conduction study was normal and did not show any length dependent neuropathy. His MRI of the brain did not show any multiple sclerosis. Patient skin biopsy is positive for small fiber neuropathy. The patient is complaining of headaches today. He has been having for a number of years and it has been gradually worsening. He has around 12 headaches a month. It is in the frontal and temporal area. It is associated with nausea and photophobia and denies any vomiting and phonophobia. It is sharp and throbbing in character. Without medications it can last for 4-5 hours and with medications it lasts for 30 minutes. Baseline headache frequency: 12/month    Risk Factors for headaches  Smoking: Denies  Coffee: 2 cups/day  Tea: 1-2 cups/day  Soda: Less than 1 cans/day  Water: 3-4 glasses/day  Sleeps around 5 hours    Medications tried  Preventative therapy:  None      Abortive therapy:  Excedrin      Current Outpatient Medications   Medication Sig    propranolol LA (INDERAL LA) 60 mg SR capsule Take 1 Cap by mouth daily.  SUMAtriptan (IMITREX) 25 mg tablet 1 tab PO at onset of headache, can repeat X 1 in 2 hrs if HA persists. Not more than 10 days/month. No current facility-administered medications for this visit. REVIEW OF SYSTEMS:   A ten system review of constitutional, cardiovascular, respiratory, musculoskeletal, endocrine, skin, SHEENT, genitourinary, psychiatric and neurologic systems was obtained and is unremarkable with the exception of numbness and tingling      EXAMINATION:   Visit Vitals  /75   Pulse 93   Ht 6' 1\" (1.854 m)   Wt 202 lb (91.6 kg)   SpO2 98%   BMI 26.65 kg/m²        General:   General appearance: Pt is in no acute distress   Distal pulses are preserved    Neurological Examination:   Mental Status: AAO x3. Speech is fluent. Follows commands, has normal fund of knowledge, attention, short term recall, comprehension and insight. Cranial Nerves: Visual fields are full. PERRL, Extraocular movements are full. Facial sensation intact V1- V3. Facial movement intact, symmetric. Hearing intact to conversation. Palate elevates symmetrically. Shoulder shrug symmetric. Tongue midline. Motor: Strength is 5/5 in all 4 ext. No atrophy. Tone: Normal    Sensation: Normal to light touch    Coordination/Cerebellar: Intact to finger-nose-finger     Gait: Romberg is negative and casual gait is normal.     Skin: No significant bruising or lacerations. Laboratory review:   No results found for this or any previous visit. 4/13/2017  Vitamin D 18.6, CBC normal, thyroid function test normal, vitamin B-12 1205, CMP normal    Imaging review:  Skin biopsy from 7/14/2017  Abnormal nerve fiber density at the distal site with normal findings at proximal site. This pattern is consistent with length dependent neuropathy affecting small nerve fibers. 7/10/2017  MRI brain without contrast  No significant abnormality  I reviewed the images    5/25/2017  EMG/nerve conduction study  There is no electrophysiological evidence of a length dependent peripheral neuropathy.     Documentation review:  None    Assessment/Plan:   Nanci Garcia is a 37 y.o. male who presented to the neurology office for management of intermittent burning and stinging sensation sometimes in the face and sometimes in the upper or lower extremities. When it happens it is in a small area. The patient had skin biopsy which was positive for small fiber neuropathy. I am going to get blood work for small fiber neuropathy. The patient is also have migraines without aura. He is taking Excedrin for it. Have asked him to decrease the consumption of Excedrin. We will start the patient on Imitrex to be taken as needed. Also, will start the patient on propranolol 60 mg daily. I did discuss with him about CGRP receptor blockers and he will be researching that. Follow-up in 6 months      Over 25 minutes was spent with the patient and family of which > 50% of the visit was spent counseling on diagnosis, management, and treatment of the diagnosis        ICD-10-CM ICD-9-CM    1. Migraine without aura and without status migrainosus, not intractable G43.009 346.10 propranolol LA (INDERAL LA) 60 mg SR capsule      SUMAtriptan (IMITREX) 25 mg tablet   2. Idiopathic peripheral neuropathy G60.9 356.9 VITAMIN B12      TSH AND FREE T4      SPEP AND YULISA, SERUM      METHYLMALONIC ACID      HEMOGLOBIN A1C W/O EAG      CRP, HIGH SENSITIVITY      METABOLIC PANEL, COMPREHENSIVE      CBC WITH AUTOMATED DIFF      MIKALA COMPREHENSIVE PLUS PANEL      ANGIOTENSIN CONVERTING ENZYME      Thank you for allowing me to participate in the care of Mr. Felipa Garcia. Please feel free to contact me if you have any questions. Phillip Maxwell MD  Diplomate, American Board of Psychiatry & Neurology (Neurology)  [place] [employer] & [employer] (Clinical Neurophysiology)    CC: None  Fax: None    This note was created using voice recognition software. Despite editing, there may be syntax errors. This note will not be viewable in [address]

## 2019-04-15 NOTE — Clinical Note
4/15/19 Patient: Etelvina Garcia YOB: 1980 Date of Visit: 4/15/2019 None None ([id number]) Patient Stated That They Have No Pcp 
VIA Dear None, Thank you for referring Mr. Etelvina Garcia to [address] [place] [address] for evaluation. My notes for this consultation are attached. If you have questions, please do not hesitate to call me. I look forward to following your patient along with you. Sincerely, Justina Al MD

## 2019-04-18 LAB
ACE SERPL-CCNC: 28 U/L (ref 14–82)
ALBUMIN SERPL ELPH-MCNC: 3.9 G/DL (ref 2.9–4.4)
ALBUMIN SERPL-MCNC: 4.5 G/DL (ref 3.5–5.5)
ALBUMIN/GLOB SERPL: 1.2 {RATIO} (ref 0.7–1.7)
ALBUMIN/GLOB SERPL: 1.6 {RATIO} (ref 1.2–2.2)
ALP SERPL-CCNC: 76 IU/L (ref 39–117)
ALPHA1 GLOB SERPL ELPH-MCNC: 0.2 G/DL (ref 0–0.4)
ALPHA2 GLOB SERPL ELPH-MCNC: 0.6 G/DL (ref 0.4–1)
ALT SERPL-CCNC: 23 IU/L (ref 0–44)
AST SERPL-CCNC: 18 IU/L (ref 0–40)
B-GLOBULIN SERPL ELPH-MCNC: 1.4 G/DL (ref 0.7–1.3)
BASOPHILS # BLD AUTO: 0 X10E3/UL (ref 0–0.2)
BASOPHILS NFR BLD AUTO: 0 %
BILIRUB SERPL-MCNC: 0.3 MG/DL (ref 0–1.2)
BUN SERPL-MCNC: 16 MG/DL (ref 6–24)
BUN/CREAT SERPL: 20 (ref 9–20)
CALCIUM SERPL-MCNC: 9.1 MG/DL (ref 8.7–10.2)
CENTROMERE B AB SER-ACNC: <0.2 AI (ref 0–0.9)
CHLORIDE SERPL-SCNC: 104 MMOL/L (ref 96–106)
CHROMATIN AB SERPL-ACNC: <0.2 AI (ref 0–0.9)
CO2 SERPL-SCNC: 25 MMOL/L (ref 20–29)
CREAT SERPL-MCNC: 0.8 MG/DL (ref 0.76–1.27)
CRP SERPL HS-MCNC: 4.03 MG/L (ref 0–3)
DSDNA AB SER-ACNC: <1 IU/ML (ref 0–9)
ENA JO1 AB SER-ACNC: <0.2 AI (ref 0–0.9)
ENA RNP AB SER-ACNC: <0.2 AI (ref 0–0.9)
ENA SCL70 AB SER-ACNC: <0.2 AI (ref 0–0.9)
ENA SM AB SER-ACNC: <0.2 AI (ref 0–0.9)
ENA SM+RNP AB SER-ACNC: <0.2 AI (ref 0–0.9)
ENA SS-A AB SER-ACNC: <0.2 AI (ref 0–0.9)
ENA SS-B AB SER-ACNC: <0.2 AI (ref 0–0.9)
EOSINOPHIL # BLD AUTO: 0.3 X10E3/UL (ref 0–0.4)
EOSINOPHIL NFR BLD AUTO: 4 %
ERYTHROCYTE [DISTWIDTH] IN BLOOD BY AUTOMATED COUNT: 13.2 % (ref 12.3–15.4)
GAMMA GLOB SERPL ELPH-MCNC: 1.2 G/DL (ref 0.4–1.8)
GLOBULIN SER CALC-MCNC: 2.8 G/DL (ref 1.5–4.5)
GLOBULIN SER-MCNC: 3.4 G/DL (ref 2.2–3.9)
GLUCOSE SERPL-MCNC: 86 MG/DL (ref 65–99)
HBA1C MFR BLD: 5.6 % (ref 4.8–5.6)
HCT VFR BLD AUTO: 42.5 % (ref 37.5–51)
HGB BLD-MCNC: 14.1 G/DL (ref 13–17.7)
IGA SERPL-MCNC: 268 MG/DL (ref 90–386)
IGG SERPL-MCNC: 1356 MG/DL (ref 700–1600)
IGM SERPL-MCNC: 50 MG/DL (ref 20–172)
IMM GRANULOCYTES # BLD AUTO: 0 X10E3/UL (ref 0–0.1)
IMM GRANULOCYTES NFR BLD AUTO: 0 %
INTERPRETATION SERPL IEP-IMP: ABNORMAL
LYMPHOCYTES # BLD AUTO: 3.3 X10E3/UL (ref 0.7–3.1)
LYMPHOCYTES NFR BLD AUTO: 40 %
Lab: NORMAL
M PROTEIN SERPL ELPH-MCNC: ABNORMAL G/DL
MCH RBC QN AUTO: 25.7 PG (ref 26.6–33)
MCHC RBC AUTO-ENTMCNC: 33.2 G/DL (ref 31.5–35.7)
MCV RBC AUTO: 77 FL (ref 79–97)
METHYLMALONATE SERPL-SCNC: 178 NMOL/L (ref 0–378)
MONOCYTES # BLD AUTO: 0.6 X10E3/UL (ref 0.1–0.9)
MONOCYTES NFR BLD AUTO: 7 %
NEUTROPHILS # BLD AUTO: 4.1 X10E3/UL (ref 1.4–7)
NEUTROPHILS NFR BLD AUTO: 49 %
PLATELET # BLD AUTO: 235 X10E3/UL (ref 150–379)
PLEASE NOTE:, 149534: ABNORMAL
POTASSIUM SERPL-SCNC: 4.3 MMOL/L (ref 3.5–5.2)
PROT SERPL-MCNC: 7.3 G/DL (ref 6–8.5)
RBC # BLD AUTO: 5.49 X10E6/UL (ref 4.14–5.8)
RIBOSOMAL P AB SER-ACNC: <0.2 AI (ref 0–0.9)
SEE BELOW:, 164879: NORMAL
SODIUM SERPL-SCNC: 140 MMOL/L (ref 134–144)
T4 FREE SERPL-MCNC: 1.23 NG/DL (ref 0.82–1.77)
TSH SERPL DL<=0.005 MIU/L-ACNC: 1.15 UIU/ML (ref 0.45–4.5)
VIT B12 SERPL-MCNC: 288 PG/ML (ref 232–1245)
WBC # BLD AUTO: 8.3 X10E3/UL (ref 3.4–10.8)

## 2019-06-15 DIAGNOSIS — G43.009 MIGRAINE WITHOUT AURA AND WITHOUT STATUS MIGRAINOSUS, NOT INTRACTABLE: ICD-10-CM

## 2019-06-15 RX ORDER — PROPRANOLOL HYDROCHLORIDE 60 MG/1
CAPSULE, EXTENDED RELEASE ORAL
Qty: 30 CAP | Refills: 1 | Status: SHIPPED | OUTPATIENT
Start: 2019-06-15 | End: 2019-07-24 | Stop reason: SDUPTHER

## 2019-07-24 DIAGNOSIS — G43.009 MIGRAINE WITHOUT AURA AND WITHOUT STATUS MIGRAINOSUS, NOT INTRACTABLE: ICD-10-CM

## 2019-07-24 NOTE — TELEPHONE ENCOUNTER
Future Appointments   Date Time Provider Latrice Krusei   10/15/2019 11:40 AM Alvarado Garcia  [facility]                         Last Appointment My Department:  4/15/2019    Please advise of refill below.   Requested Prescriptions     Pending Prescriptions Disp Refills    propranolol LA (INDERAL LA) 60 mg SR capsule 90 Cap 1     Sig: TAKE 1 CAPSULE BY MOUTH EVERY DAY

## 2019-07-26 RX ORDER — PROPRANOLOL HYDROCHLORIDE 60 MG/1
CAPSULE, EXTENDED RELEASE ORAL
Qty: 90 CAP | Refills: 1 | Status: SHIPPED | OUTPATIENT
Start: 2019-07-26

## 2021-10-20 ENCOUNTER — PREPPED CHART (OUTPATIENT)
Dept: URBAN - METROPOLITAN AREA CLINIC 58 | Facility: CLINIC | Age: 41
End: 2021-10-20

## 2021-10-20 PROBLEM — H43.393 VITREOUS OPACITIES, OTHER: Noted: 2021-10-20

## 2021-10-20 PROBLEM — H25.13 NS CATARACT: Noted: 2021-10-20

## 2021-10-20 PROBLEM — H35.89 VITREO-RETINAL TUFT: Noted: 2021-10-20

## 2022-04-16 ASSESSMENT — VISUAL ACUITY
OD_CC: 20/20
OS_CC: 20/20

## 2022-04-16 ASSESSMENT — TONOMETRY
OD_IOP_MMHG: 19
OS_IOP_MMHG: 19

## 2022-04-20 ENCOUNTER — 6 MONTH FOLLOW UP (OUTPATIENT)
Dept: URBAN - METROPOLITAN AREA CLINIC 58 | Facility: CLINIC | Age: 42
End: 2022-04-20

## 2022-04-20 DIAGNOSIS — H35.89: ICD-10-CM

## 2022-04-20 DIAGNOSIS — H43.393: ICD-10-CM

## 2022-04-20 PROCEDURE — 92201 OPSCPY EXTND RTA DRAW UNI/BI: CPT

## 2022-04-20 PROCEDURE — 92134 CPTRZ OPH DX IMG PST SGM RTA: CPT

## 2022-04-20 PROCEDURE — 92014 COMPRE OPH EXAM EST PT 1/>: CPT

## 2022-04-20 ASSESSMENT — VISUAL ACUITY
OD_CC: 20/20
OS_CC: 20/20

## 2022-04-20 ASSESSMENT — TONOMETRY
OS_IOP_MMHG: 16
OD_IOP_MMHG: 14